# Patient Record
Sex: FEMALE | Race: WHITE
[De-identification: names, ages, dates, MRNs, and addresses within clinical notes are randomized per-mention and may not be internally consistent; named-entity substitution may affect disease eponyms.]

---

## 2017-07-20 ENCOUNTER — HOSPITAL ENCOUNTER (EMERGENCY)
Dept: HOSPITAL 62 - ER | Age: 22
LOS: 1 days | Discharge: LEFT BEFORE DISCHARGE | End: 2017-07-21
Payer: SELF-PAY

## 2017-07-20 VITALS — SYSTOLIC BLOOD PRESSURE: 128 MMHG | DIASTOLIC BLOOD PRESSURE: 79 MMHG

## 2017-07-20 DIAGNOSIS — Z53.21: Primary | ICD-10-CM

## 2018-04-24 ENCOUNTER — HOSPITAL ENCOUNTER (EMERGENCY)
Dept: HOSPITAL 62 - ER | Age: 23
LOS: 1 days | Discharge: HOME | End: 2018-04-25
Payer: MEDICAID

## 2018-04-24 DIAGNOSIS — G47.00: Primary | ICD-10-CM

## 2018-04-24 DIAGNOSIS — F17.200: ICD-10-CM

## 2018-04-24 DIAGNOSIS — Z91.14: ICD-10-CM

## 2018-04-24 DIAGNOSIS — Z88.6: ICD-10-CM

## 2018-04-24 LAB
ADD MANUAL DIFF: NO
ALBUMIN SERPL-MCNC: 4.4 G/DL (ref 3.5–5)
ALP SERPL-CCNC: 56 U/L (ref 38–126)
ALT SERPL-CCNC: 22 U/L (ref 9–52)
ANION GAP SERPL CALC-SCNC: 12 MMOL/L (ref 5–19)
APAP SERPL-MCNC: < 10 UG/ML (ref 10–30)
APPEARANCE UR: (no result)
APTT PPP: YELLOW S
AST SERPL-CCNC: 20 U/L (ref 14–36)
BARBITURATES UR QL SCN: NEGATIVE
BASOPHILS # BLD AUTO: 0.1 10^3/UL (ref 0–0.2)
BASOPHILS NFR BLD AUTO: 0.5 % (ref 0–2)
BILIRUB DIRECT SERPL-MCNC: 0.3 MG/DL (ref 0–0.4)
BILIRUB SERPL-MCNC: 0.3 MG/DL (ref 0.2–1.3)
BILIRUB UR QL STRIP: NEGATIVE
BUN SERPL-MCNC: 12 MG/DL (ref 7–20)
CALCIUM: 10 MG/DL (ref 8.4–10.2)
CHLORIDE SERPL-SCNC: 103 MMOL/L (ref 98–107)
CO2 SERPL-SCNC: 30 MMOL/L (ref 22–30)
EOSINOPHIL # BLD AUTO: 0.2 10^3/UL (ref 0–0.6)
EOSINOPHIL NFR BLD AUTO: 1.7 % (ref 0–6)
ERYTHROCYTE [DISTWIDTH] IN BLOOD BY AUTOMATED COUNT: 15.2 % (ref 11.5–14)
ETHANOL SERPL-MCNC: < 10 MG/DL
GLUCOSE SERPL-MCNC: 77 MG/DL (ref 75–110)
GLUCOSE UR STRIP-MCNC: NEGATIVE MG/DL
HCT VFR BLD CALC: 35.5 % (ref 36–47)
HGB BLD-MCNC: 11.8 G/DL (ref 12–15.5)
KETONES UR STRIP-MCNC: NEGATIVE MG/DL
LITHIUM SERPL-SCNC: 0.5 MEQ/L (ref 0.6–1.2)
LYMPHOCYTES # BLD AUTO: 2.4 10^3/UL (ref 0.5–4.7)
LYMPHOCYTES NFR BLD AUTO: 21.7 % (ref 13–45)
MCH RBC QN AUTO: 28.5 PG (ref 27–33.4)
MCHC RBC AUTO-ENTMCNC: 33.2 G/DL (ref 32–36)
MCV RBC AUTO: 86 FL (ref 80–97)
METHADONE UR QL SCN: NEGATIVE
MONOCYTES # BLD AUTO: 0.7 10^3/UL (ref 0.1–1.4)
MONOCYTES NFR BLD AUTO: 6.1 % (ref 3–13)
NEUTROPHILS # BLD AUTO: 7.7 10^3/UL (ref 1.7–8.2)
NEUTS SEG NFR BLD AUTO: 70 % (ref 42–78)
NITRITE UR QL STRIP: NEGATIVE
PCP UR QL SCN: NEGATIVE
PH UR STRIP: 7 [PH] (ref 5–9)
PLATELET # BLD: 249 10^3/UL (ref 150–450)
POTASSIUM SERPL-SCNC: 4.5 MMOL/L (ref 3.6–5)
PROT SERPL-MCNC: 7.1 G/DL (ref 6.3–8.2)
PROT UR STRIP-MCNC: NEGATIVE MG/DL
RBC # BLD AUTO: 4.13 10^6/UL (ref 3.72–5.28)
SALICYLATES SERPL-MCNC: < 1 MG/DL (ref 2–20)
SODIUM SERPL-SCNC: 144.7 MMOL/L (ref 137–145)
SP GR UR STRIP: 1.01
TOTAL CELLS COUNTED % (AUTO): 100 %
URINE AMPHETAMINES SCREEN: NEGATIVE
URINE BENZODIAZEPINES SCREEN: NEGATIVE
URINE COCAINE SCREEN: NEGATIVE
URINE MARIJUANA (THC) SCREEN: NEGATIVE
UROBILINOGEN UR-MCNC: NEGATIVE MG/DL (ref ?–2)
WBC # BLD AUTO: 11 10^3/UL (ref 4–10.5)

## 2018-04-24 PROCEDURE — 85025 COMPLETE CBC W/AUTO DIFF WBC: CPT

## 2018-04-24 PROCEDURE — 80178 ASSAY OF LITHIUM: CPT

## 2018-04-24 PROCEDURE — 93010 ELECTROCARDIOGRAM REPORT: CPT

## 2018-04-24 PROCEDURE — 80053 COMPREHEN METABOLIC PANEL: CPT

## 2018-04-24 PROCEDURE — 93005 ELECTROCARDIOGRAM TRACING: CPT

## 2018-04-24 PROCEDURE — 99285 EMERGENCY DEPT VISIT HI MDM: CPT

## 2018-04-24 PROCEDURE — 81001 URINALYSIS AUTO W/SCOPE: CPT

## 2018-04-24 PROCEDURE — 80307 DRUG TEST PRSMV CHEM ANLYZR: CPT

## 2018-04-24 PROCEDURE — 36415 COLL VENOUS BLD VENIPUNCTURE: CPT

## 2018-04-24 NOTE — ER DOCUMENT REPORT
ED Psych Disorder / Suicide





- General


Chief Complaint: Psych Problem


Stated Complaint: PSYCH EVAL


Time Seen by Provider: 04/24/18 16:44


Notes: 


The patient is a 22-year-old female, past medical history prior suicide attempts

, psychosis, presents requesting help with her medications.  She has not slept 

for the past week.  EMS brought in her medications and she has not taken her 

nightly Risperdal or Lithium for the past 3 days.  She said Lamictal has helped 

her in the past.  She denies any SI, HI or substance abuse for the past week.  

Her last drug use was 1 week ago and included cocaine, heroin and 

methamphetamines.  Patient denies chest pain, shortness of breath, headache, 

fevers, neck stiffness or attempt to harm herself.


TRAVEL OUTSIDE OF THE [place] IN LAST 30 DAYS: No





- Related Data


Allergies/Adverse Reactions: 


 





quetiapine fumarate [From Seroquel] Allergy (Severe, Verified 05/20/16 14:08)


 


acetaminophen [From Tylenol] Allergy (Verified 05/20/16 14:08)


 











Past Medical History





- General


Information source: Patient





- Social History


Smoking Status: Current Every Day Smoker


Frequency of alcohol use: Social


Drug Abuse: Cocaine, Heroin, Methamphetamine


Family History: Reviewed & Not Pertinent


Patient has suicidal ideation: No


Patient has homicidal ideation: No


Pulmonary Medical History: 


   Denies: Hx Tuberculosis


Neurological Medical History: Reports: Hx Migraine.  Denies: Hx Cerebrovascular 

Accident, Hx Seizures


Endocrine Medical History: Denies: Hx Graves' Disease, Hx Hyperthyroidism, Hx 

Hypothyroidism


Renal/ Medical History: Denies: Hx End Stage Renal Disease, Hx Kidney Stones, 

Hx Ovarian Cysts, Hx Peritoneal Dialysis, Hx Pelvic Inflammatory Disease


Malignancy Medical History: Denies: Hx Breast Cancer, Hx Cervical Cancer, Hx 

Leukemia, Hx Ovarian Cancer


Musculoskeltal Medical History: Denies Hx Multiple Sclerosis, Reports Hx 

Musculoskeletal Deformity, Reports Hx Musculoskeletal Trauma


Psychiatric Medical History: Reports: Hx Attention Deficit Hyperactivity 

Disorder, Hx Bipolar Disorder, Hx Depression, Hx Schizophrenia


   Denies: Hx Dementia


Traumatic Medical History: Reports: Hx Fractures


Infectious Medical History: Denies: Hx HIV


Past Surgical History: Reports: Hx Orthopedic Surgery - Left tib/fib repair, 

left shouldar ac repair..  Denies: Hx Pacemaker





- Immunizations


Immunizations up to date: Yes


Hx Diphtheria, Pertussis, Tetanus Vaccination: Yes





Review of Systems





- Review of Systems


Notes: 


REVIEW OF SYSTEMS:


CONSTITUTIONAL: -fevers, -chills


EENT: -eye pain, -difficulty swallowing, -nasal congestion


CARDIOVASCULAR: -chest pain, -syncope.


RESPIRATORY: -cough, -SOB


GASTROINTESTINAL: -abdominal pain, -nausea, -vomiting, -diarrhea


GENITOURINARY: -dysuria, -hematuria


MUSCULOSKELETAL: -back pain, -neck pain


SKIN: -rash or skin lesions.


HEMATOLOGIC: -easy bruising or bleeding.


LYMPHATIC: -swollen, enlarged glands.


NEUROLOGICAL: -altered mental status or loss of consciousness, -headache, -

neurologic symptoms


PSYCHIATRIC: -anxiety, -depression, +insomnia


ALL OTHER SYSTEMS REVIEWED AND NEGATIVE.





Physical Exam





- Vital signs


Vitals: 


 











Temp Pulse Resp BP Pulse Ox


 


 98.0 F   112 H  16   126/74 H  98 


 


 04/24/18 16:47  04/24/18 16:47  04/24/18 16:47  04/24/18 16:47  04/24/18 16:47














- Notes


Notes: 


PHYSICAL EXAMINATION:


GENERAL: Well-appearing, well-nourished and in no acute distress.


HEAD: Atraumatic, normocephalic.


EYES: Pupils equal round and reactive to light, extraocular movements intact, 

sclera anicteric, conjunctiva are normal.


ENT: nares patent, oropharynx clear without exudates.  Moist mucous membranes.


NECK: Normal range of motion, supple without lymphadenopathy


LUNGS: Breath sounds clear to auscultation bilaterally and equal.  No wheezes 

rales or rhonchi.


HEART: Mild tachycardia.


ABDOMEN: Soft, nontender, normoactive bowel sounds.  No guarding, no rebound.  

No masses appreciated.


EXTREMITIES: Normal range of motion, no pitting or edema.  No cyanosis.


NEUROLOGICAL: Cranial nerves grossly intact.  Normal speech, normal gait.  

Normal sensory and motor exams.


PSYCH: Appropriate mood, cooperative, denies SI or HI.  Does not appear acutely 

psychotic.


SKIN: Warm, Dry, normal turgor, no rashes or lesions noted.





Course





- Re-evaluation


Re-evalutation: 


Patient appears very well and is appropriate.  She does not appear acutely 

psychotic and has no SI or HI at this time.  No indication for IVC at this 

time.  Patient is requesting medication changes, as she says that Lamictal has 

helped her better than lithium or Risperdal.  She has her packet of medications 

with her and she has not taken her lithium or Risperdal for the past 3 days.  

She took her dose this morning.  Instructed her that her cocaine and 

methamphetamine use, along with not taking her Risperdal, is most likely 

leading to her insomnia.  She is counseled about always taking her medications 

and speaking to her primary mental health team about medication changes. Pt 

would like to stay overnight and speak to mental health about any medication 

changes.  She has no criteria for IVC.





- Vital Signs


Vital signs: 


 











Temp Pulse Resp BP Pulse Ox


 


 98.0 F   112 H  16   126/74 H  98 


 


 04/24/18 16:47  04/24/18 17:25  04/24/18 16:47  04/24/18 16:47  04/24/18 16:47














- Laboratory


Result Diagrams: 


 04/24/18 17:43





 04/24/18 17:43


Laboratory results interpreted by me: 


 











  04/24/18 04/24/18





  17:43 17:43


 


WBC  11.0 H 


 


Hgb  11.8 L 


 


Hct  35.5 L 


 


RDW  15.2 H 


 


Salicylates   < 1.0 L


 


Acetaminophen   < 10 L


 


Lithium   0.5 L














Discharge





- Discharge


Clinical Impression: 


 Nonadherence to medication





Insomnia


Qualifiers:


 Insomnia type: unspecified Qualified Code(s): G47.00 - Insomnia, unspecified





Condition: Stable

## 2018-04-24 NOTE — EKG REPORT
SEVERITY:- ABNORMAL ECG -

SINUS RHYTHM

ST ELEVATION SUGGESTS NORMAL VARIANT.

:

Confirmed by: Dilip Block MD 24-Apr-2018 18:35:39

## 2018-04-25 VITALS — DIASTOLIC BLOOD PRESSURE: 68 MMHG | SYSTOLIC BLOOD PRESSURE: 119 MMHG

## 2018-04-25 NOTE — PSYCHOLOGICAL NOTE
Psych Note





- Psych Note


Psych Note: 


Reason for consult:request for medication changes





The patient is a 22-year-old female, past medical history prior suicide attempts

, psychosis, presents requesting help with her medications.  She has not slept 

for the past week.  EMS brought in her medications and she has not taken her 

nightly Risperdal or Lithium for the past 3 days.  She said Lamictal has helped 

her in the past. 





Patient disclosed she needs her "medication regulated."  She is seen by 

[facility] and just had a home visit yesterday and 

disclosed her outpatient provider told her they would be holding off any 

medication changes currently. She reports that she came to [facility] ED for 

medication changes as a "jump decision that I now regret."  She discloses that 

he understands her outpatient provider is most appropriate resource for any 

medication changes since they follow her progress.  Patient agrees to contact 

her outpatient provider with further questions in regards to her medications.  

Patient denies missing any medications stating that she did receive a new pack 

of medications so her blister packs are overlapping and it might look like she 

has not taken any but she has. 





Patient is alert and orientated to person, place, time and circumstance.  Mood 

is euthymic with congruent affect as evidenced by patient smiling and openly 

engaging with clinician.  Patient denies suicidal and homicidal ideation.  

Delusions are absent and behaviors congruent with intact reality based 

presentation i.e. organized and linear thought processes.  Conversational 

speech was within normal rate, tone and prosody.  Eye contact was well-

maintained.  Intellectual abilities appear to be within the average range.  

Attention and concentration are good.  Insight, judgment, impulse control are 

currently good.





Behavioral Health Team contacted [facility] to confirm 

patient receives services through them. Patient is assigned to an ACT Team.  

She is currently in [facility] for housing and is being followed until the patient 

decides if she will return to [facility] or if she needs to transfer to 

a new provider if she stays in [facility]. Patient was just in [facility] for psychiatric treatment  for 03/14- 03/28.  Patient was in [facility] from 04/05-04/11.  Patient was voluntary while in [facility]. Patient was seen on Monday and received her monthly medication shot. 

Patient will be seen Monday at her home, but if she needs to be seen before she 

can walk in to the office. 





No medication changes at this time





Diagnosis


1. 296.44 (F31.2) Bipolar I with psychotic features





Impression/plan: Patient is cleared from acute psychiatric services. Patient 

has a higher level of care with [facility].  Patient was seen by her 

provider yesterday and disclosed they wanted to hold off on any medication 

changes.  Patient disclosed she had been taking her medications; however, her 

blister packs show she has missed a few days.  It is recommended the patient 

take her medication as prescribed and to follow up with her outpatient provider 

for any continued concerns for nonacute medication changes. Patient will be 

seen Monday at her home, but if she needs to be seen before she can walk in to 

the office. Dr. Ingram was consulted on the care and management of this patient

, attending physician is in agreement with recommendations and disposition.

## 2018-04-25 NOTE — ER DOCUMENT REPORT
Doctor's Note


Notes: 





04/25/18 10:22


As the rounding physician for our psychiatric patients, I have reviewed the 

chart, vitals, lab work. Patient has been examined and noted to be stable, she 

states that she is here to tweak her medications, wishes to be discharged home. 

I am awaiting mental health in put.

## 2018-11-15 ENCOUNTER — HOSPITAL ENCOUNTER (EMERGENCY)
Dept: HOSPITAL 62 - ER | Age: 23
LOS: 1 days | Discharge: HOME | End: 2018-11-16
Payer: SELF-PAY

## 2018-11-15 DIAGNOSIS — F31.9: Primary | ICD-10-CM

## 2018-11-15 DIAGNOSIS — Z88.6: ICD-10-CM

## 2018-11-15 DIAGNOSIS — F30.9: ICD-10-CM

## 2018-11-15 DIAGNOSIS — Z86.19: ICD-10-CM

## 2018-11-15 LAB
ADD MANUAL DIFF: NO
ALBUMIN SERPL-MCNC: 4.4 G/DL (ref 3.5–5)
ALP SERPL-CCNC: 58 U/L (ref 38–126)
ALT SERPL-CCNC: 59 U/L (ref 9–52)
ANION GAP SERPL CALC-SCNC: 10 MMOL/L (ref 5–19)
APPEARANCE UR: (no result)
APTT PPP: YELLOW S
AST SERPL-CCNC: 34 U/L (ref 14–36)
BARBITURATES UR QL SCN: NEGATIVE
BASOPHILS # BLD AUTO: 0 10^3/UL (ref 0–0.2)
BASOPHILS NFR BLD AUTO: 0.8 % (ref 0–2)
BILIRUB DIRECT SERPL-MCNC: 0.1 MG/DL (ref 0–0.4)
BILIRUB SERPL-MCNC: 0.7 MG/DL (ref 0.2–1.3)
BILIRUB UR QL STRIP: NEGATIVE
BUN SERPL-MCNC: 12 MG/DL (ref 7–20)
CALCIUM: 9.6 MG/DL (ref 8.4–10.2)
CHLORIDE SERPL-SCNC: 106 MMOL/L (ref 98–107)
CO2 SERPL-SCNC: 27 MMOL/L (ref 22–30)
EOSINOPHIL # BLD AUTO: 0.1 10^3/UL (ref 0–0.6)
EOSINOPHIL NFR BLD AUTO: 1.8 % (ref 0–6)
ERYTHROCYTE [DISTWIDTH] IN BLOOD BY AUTOMATED COUNT: 14.5 % (ref 11.5–14)
ETHANOL SERPL-MCNC: < 10 MG/DL
GLUCOSE SERPL-MCNC: 91 MG/DL (ref 75–110)
GLUCOSE UR STRIP-MCNC: NEGATIVE MG/DL
HCT VFR BLD CALC: 36.8 % (ref 36–47)
HGB BLD-MCNC: 12.6 G/DL (ref 12–15.5)
KETONES UR STRIP-MCNC: NEGATIVE MG/DL
LYMPHOCYTES # BLD AUTO: 1.7 10^3/UL (ref 0.5–4.7)
LYMPHOCYTES NFR BLD AUTO: 31.9 % (ref 13–45)
MCH RBC QN AUTO: 28.3 PG (ref 27–33.4)
MCHC RBC AUTO-ENTMCNC: 34.3 G/DL (ref 32–36)
MCV RBC AUTO: 83 FL (ref 80–97)
METHADONE UR QL SCN: NEGATIVE
MONOCYTES # BLD AUTO: 0.4 10^3/UL (ref 0.1–1.4)
MONOCYTES NFR BLD AUTO: 7.4 % (ref 3–13)
NEUTROPHILS # BLD AUTO: 3.2 10^3/UL (ref 1.7–8.2)
NEUTS SEG NFR BLD AUTO: 58.1 % (ref 42–78)
NITRITE UR QL STRIP: NEGATIVE
PCP UR QL SCN: NEGATIVE
PH UR STRIP: 7 [PH] (ref 5–9)
PLATELET # BLD: 188 10^3/UL (ref 150–450)
POTASSIUM SERPL-SCNC: 5.1 MMOL/L (ref 3.6–5)
PROT SERPL-MCNC: 7.3 G/DL (ref 6.3–8.2)
PROT UR STRIP-MCNC: NEGATIVE MG/DL
RBC # BLD AUTO: 4.45 10^6/UL (ref 3.72–5.28)
SODIUM SERPL-SCNC: 142.7 MMOL/L (ref 137–145)
SP GR UR STRIP: 1.02
TOTAL CELLS COUNTED % (AUTO): 100 %
URINE AMPHETAMINES SCREEN: NEGATIVE
URINE BENZODIAZEPINES SCREEN: NEGATIVE
URINE COCAINE SCREEN: NEGATIVE
URINE MARIJUANA (THC) SCREEN: NEGATIVE
UROBILINOGEN UR-MCNC: 2 MG/DL (ref ?–2)
WBC # BLD AUTO: 5.4 10^3/UL (ref 4–10.5)

## 2018-11-15 PROCEDURE — 80307 DRUG TEST PRSMV CHEM ANLYZR: CPT

## 2018-11-15 PROCEDURE — 85025 COMPLETE CBC W/AUTO DIFF WBC: CPT

## 2018-11-15 PROCEDURE — 93010 ELECTROCARDIOGRAM REPORT: CPT

## 2018-11-15 PROCEDURE — 80053 COMPREHEN METABOLIC PANEL: CPT

## 2018-11-15 PROCEDURE — 99285 EMERGENCY DEPT VISIT HI MDM: CPT

## 2018-11-15 PROCEDURE — 81001 URINALYSIS AUTO W/SCOPE: CPT

## 2018-11-15 PROCEDURE — 93005 ELECTROCARDIOGRAM TRACING: CPT

## 2018-11-15 PROCEDURE — 36415 COLL VENOUS BLD VENIPUNCTURE: CPT

## 2018-11-15 RX ADMIN — BENZTROPINE MESYLATE SCH MG: 1 TABLET ORAL at 16:44

## 2018-11-15 RX ADMIN — OLANZAPINE SCH MG: 5 TABLET, FILM COATED ORAL at 17:17

## 2018-11-15 NOTE — ER DOCUMENT REPORT
ED Psych Disorder / Suicide





- General


TRAVEL OUTSIDE OF THE [place] IN LAST 30 DAYS: No





<ARCHIE GODFREY - Last Filed: 11/15/18 17:55>





<ABELINO MASTERS - Last Filed: 18 08:49>





<HANNAHBENIGNO - Last Filed: 18 10:11>





- General


Chief Complaint: Psych Problem


Stated Complaint: PSYCH EVAL


Time Seen by Provider: 11/15/18 16:35


Notes: 





Patient was brought in by EMS and says that she needs "a break from grieving".  

She says that her best friend just  suddenly and unexpectedly.  During the 

conversation, patient exhibits flight of ideas, confused somewhat, and 

unwillingness to talk at times.  Says she does not want to mention the  

name but that it is a relative who has .  Patient says she feels very 

tired.  When asked about mental illness history, she mentions bipolar and 

schizophrenia.  Says she has been on medicines for these conditions but no 

longer taking anything at this time.  Sometimes, when the patient is answering 

my questions, I am not sure if she is doing so in an uncontrolled manner or if 

she is thinking and planning her responses to sound unusual or weird.





Says she has hepatitis C, diagnosed about a year ago.  Denies using any illicit 

drugs. (ARCHIE GODFREY)





- Related Data


Allergies/Adverse Reactions: 


 





quetiapine fumarate [From Seroquel] Allergy (Severe, Verified 16 14:08)


 


acetaminophen [From Tylenol] Allergy (Verified 16 14:08)


 











Past Medical History





- Social History


Smoking Status: Never Smoker


Chew tobacco use (# tins/day): No


Frequency of alcohol use: None


Drug Abuse: None


Family History: Reviewed & Not Pertinent


Patient has suicidal ideation: No


Patient has homicidal ideation: No


Pulmonary Medical History: 


   Denies: Hx Tuberculosis


Neurological Medical History: Reports: Hx Migraine


Musculoskeletal Medical History: Reports Hx Musculoskeletal Deformity, Reports 

Hx Musculoskeletal Trauma


Psychiatric Medical History: Reports: Hx Attention Deficit Hyperactivity 

Disorder, Hx Bipolar Disorder, Hx Depression, Hx Schizophrenia


   Denies: Hx Dementia


Traumatic Medical History: Reports: Hx Fractures


Infectious Medical History: Reports: Hx Hepatitis - Hepatitis C.  Denies: Hx HIV


Past Surgical History: Reports: Hx Orthopedic Surgery - Left tib/fib repair, 

left shouldar ac repair.





- Immunizations


Immunizations up to date: Yes


Hx Diphtheria, Pertussis, Tetanus Vaccination: Yes





<EPIFANIOARCHIE - Last Filed: 11/15/18 17:55>





Review of Systems





<ARCHIE GODFREY - Last Filed: 11/15/18 17:55>





<ABEILNO MASTERS - Last Filed: 18 08:49>





<BENIGNO YOUNG - Last Filed: 18 10:11>





- Review of Systems


Notes: 





REVIEW OF SYSTEMS: Patient is not very helpful.  Does not answer with much 

information.  





CONSTITUTIONAL :  Denies fever.


  


EENT:   Denies eye, ear, nose or mouth or throat pain or other symptoms.





CARDIOVASCULAR:  Denies chest pain.





RESPIRATORY:  Denies cough, chest congestion, or shortness of breath.





GASTROINTESTINAL:  Denies abdominal pain or nausea, vomiting, or diarrhea.





GENITOURINARY:  Denies difficulty or painful urinating, urinary frequency, 

blood in urine.





MUSCULOSKELETAL:  Denies back or neck pain.  Denies joint pain or swelling.





SKIN:   Denies rash or skin lesions.





NEUROLOGICAL:  Denies LOC or altered mental status.  Sometimes headache.  

Denies sensory loss or motor deficits.





ALL OTHER SYSTEMS REVIEWED AND NEGATIVE. (ARCHIE GODFREY)





Physical Exam





- Vital signs


Interpretation: Normal





<ARCHIE GODFREY - Last Filed: 11/15/18 17:55>





<ABELINO MASTERS - Last Filed: 18 08:49>





<BENIGNO YOUNG - Last Filed: 18 10:11>





- Vital signs


Vitals: 


 











Temp Pulse Resp BP Pulse Ox


 


 98.0 F   85   16   135/80 H  100 


 


 11/15/18 15:55  11/15/18 15:55  11/15/18 15:55  11/15/18 15:55  11/15/18 15:55











Notes: 





PHYSICAL EXAMINATION:





GENERAL: Well-appearing, in no acute distress.  Awake and alert, but seems 

confused and answers some questions appropriately while being invasive with 

other questions.  Sometimes I get the impression that the patient is thinking 

about her conversation so that she can sound strange or weird.  She does seem 

to be somewhat hyper.





HEAD: Atraumatic, normocephalic.





EYES: Pupils equal round and reactive to light, extraocular movements intact.





ENT: oropharynx clear without exudates.  Moist mucous membranes.





NECK: Normal range of motion, supple.





LUNGS: Breath sounds clear and equal bilaterally.





HEART: Regular rate and rhythm without murmurs.





ABDOMEN: Soft, nontender.  No guarding or rebound.  No masses.





BACK:  No tenderness throughout entire back.





EXTREMITIES: Normal range of motion without pain.





NEUROLOGICAL:  Normal speech, normal gait.  Normal sensory, motor, and reflex 

exams.  Awake, alert, and oriented x3.  Cranial nerves normal.





PSYCH: Calm.  Flights of ideas.  Hyper conversation.





SKIN: Warm, dry, no rashes. (ARCHIE GODFREY)





Course





- Laboratory


Result Diagrams: 


 11/15/18 15:30





 11/15/18 15:30





- EKG Interpretation by Me


EKG shows normal: Sinus rhythm


Rate: Normal


Rhythm: NSR





<ARCHIE GODFREY - Last Filed: 11/15/18 17:55>





- Laboratory


Result Diagrams: 


 11/15/18 15:30





 11/15/18 15:30





<ABELINO MASTERS - Last Filed: 18 08:49>





- Laboratory


Result Diagrams: 


 11/15/18 15:30





 11/15/18 15:30





<BENIGNO YOUNG - Last Filed: 18 10:11>





- Re-evaluation


Re-evalutation: 





18 10:11


Patient was evaluated at bedside by myself earlier this morning.  Patient is 

resting comfortably with no obvious distress.  Patient states feeling much 

better at this time denies any homicidal suicidal ideation patient also has no 

signs of acute psychosis or pollo.  Patient agrees with discharge home follow-

up as directed by our psychiatric providers.  Patient will be discharged home. (

BENIGNO YOUNG)





- Vital Signs


Vital signs: 


 











Temp Pulse Resp BP Pulse Ox


 


 98.2 F   84   16   105/63   100 


 


 18 09:26  18 09:26  18 09:26  18 09:26  18 09:26














- Laboratory


Laboratory results interpreted by me: 


 











  11/15/18 11/15/18 11/15/18





  15:30 15:30 15:30


 


RDW  14.5 H  


 


Potassium   5.1 H 


 


ALT   59 H 


 


Urine Urobilinogen    2.0 H














Discharge





<ARCHIE GODFREY - Last Filed: 11/15/18 17:55>





<ABELINO MASTERS - Last Filed: 18 08:49>





<BENIGNO YOUNG - Last Filed: 18 10:11>





- Discharge


Clinical Impression: 


 Manic behavior





Bipolar disorder


Qualifiers:


 Active/Remission status: currently active Current bipolar episode type: manic 

Current episode severity: unspecified Qualified Code(s): F31.9 - Bipolar 

disorder, unspecified





Condition: Stable


Disposition: HOME, SELF-CARE


Additional Instructions: 


You have been evaluated by both medical and behavioral health teams and been 

deemed appropriate for discharge.  You have been started on new medications of 

Zyprexa at 5 mg twice daily, Cogentin 1 mg daily and clonidine 0.1mg nightly; 

these take as directed.  You are recommended follow-up with your outpatient 

mental health provider, dinh, in 3-5 days for continued outpatient mental 

services.





Bipolar Disorder





     Bipolar disorder is also called manic-depressive disorder. Depression 

alternates with brain hyperactivity called pollo.  Each phase lasts from 

several days to a few weeks.  We don't know exactly what causes bipolar disorder

, but it's treatable.


     During the "manic phase," you may feel elated and energetic.  You may have 

racing thoughts, rapid speech, increased activity, and grandiose ideas.  During 

this time, you may not realize how poor your judgement is.  Inappropriate 

spending, drug abuse, excessive alcohol use, marriage problems, and 

irresponsible sexual behavior are common during the manic phase.


     During the "depressive phase," you might feel depressed, guilty, worthless

, fatigued, and unable to concentrate.  You might have thoughts of suicide.


     Good treatments are available for bipolar disorder. Lithium is a classic 

drug for bipolar disorder, and is still often useful. If the manic phase is 

very mild, an antidepressant alone can be prescribed.  If the manic phase is 

very severe, an antipsychotic medicine (such as Haldol) may be needed. The 

treatment must be matched to your symptoms, so it's important to work closely 

with your psychiatric care provider.


     Contact your physician, the hospital emergency center, crisis line, or 

your counsellor if you are losing control or having self-destructive thoughts.











AT ANY TIME, IF YOUR SYMPTOMS CHANGE SIGNIFICANTLY OR WORSEN OR YOU DEVELOP NEW 

SYMPTOMS, RETURN TO THE EMERGENCY DEPARTMENT IMMEDIATELY FOR RE-EVALUATION.














Prescriptions: 


Benztropine Mesylate [Cogentin 1 mg Tablet] 1 mg PO QHS #7 tablet


Clonidine HCl [Catapres] 0.1 mg PO DAILY #7 tablet


Olanzapine [Zyprexa 5 mg Tablet] 5 mg PO Q12 #14 tablet


Referrals: 


[facility] [Outside] - Follow up in 3-5 days

## 2018-11-15 NOTE — PSYCHOLOGICAL NOTE
Psych Note





- Psych Note


Date seen by psych provider: 11/15/18


Time seen by psych provider: 15:40


Psych Note: 


Reason for Consult: manic





Patient presented to [facility] ED via EMS.  Patient reports that she had bad news of 

finding out her best friend  today; patient became tearful and stood flat 

against the wall.  She continued reports that her best friend is her cousin and 

her "grandmother's sister's aunt."  Patient then started to laugh and reported 

that she did not even understand what she just said.  Patient states "yes I am 

in a manic episode... Yes I screamed in the law heard me... Yes I broke the 

fridge with BPA... Yes the water bottle from the car was BPA free and I really 

appreciated being able to drink out of that... Yes I saw the Grim Dali.... Yes 

I heard (unintelligible)..." Patient then stated that she would really wish she 

could sleep until 6 AM tomorrow.  At this point patient attempted to show the 

clinician that she scratched her stomach and lifted her shirt all the way up to 

her neck.  Clinician reported she would talk to the attending physician to see 

what can be done to assist with helping her sleep.  Patient stated thank you 

and started crying again.  As clinician open the door was noted the patient 

continued to speak and continued to make demonstrate facial affect indicating 

she was still talking; however, no sound was coming from her mouth.





Patient is alert and orientated to person, place, time and circumstance.  Mood 

is manic with labile affect.  Patient denies suicidal homicidal ideation.  

Patient reports seeing the Uriel reaper.  Patient is currently having 

difficulty with linear organized conversation demonstrating flight of thought.  

Attention and concentration is poor.  Insight, judgment, impulse control is 

poor.





Medication recommendations per [facility]'s contracted psychiatrist Dr. Anel MIKE 

are as follows 


Zyprexa MG twice daily 


clonidine 0.1 mg nightly 


Cogentin 1 mg daily





Diagnosis


1. 296.44 (F31.2) Bipolar I with psychotic features





Impression\plan: Patient is recommended for IVC.  Patient is currently manic, 

labile affect with flight of thought and possible hallucinations.  Patient's 

attention, concentration, insight, judgment, and impulse control is poor; she 

is a danger to herself.  Medication recommendations have been provided. patient 

will be reevaluated.  Dr. Ingram was consulted and the care management this 

patient; attending physician is agreement with recommendations and disposition.

## 2018-11-16 VITALS — DIASTOLIC BLOOD PRESSURE: 63 MMHG | SYSTOLIC BLOOD PRESSURE: 105 MMHG

## 2018-11-16 RX ADMIN — BENZTROPINE MESYLATE SCH MG: 1 TABLET ORAL at 09:19

## 2018-11-16 RX ADMIN — OLANZAPINE SCH MG: 5 TABLET, FILM COATED ORAL at 09:18

## 2018-11-16 NOTE — PSYCHOLOGICAL NOTE
Psych Note





- Psych Note


Date seen by psych provider: 11/16/18


Time seen by psych provider: 07:35


Psych Note: 


Reason for Consult: manic


Consent permissions: Patient's mother and father Vania [phone]





Check-in conducted with patient


Patient reports that she is feeling much better.  She confirms that she got 

good sleep last night.  When asked about seeing the Grim Reaper previously she 

states that it was the day before yesterday.  She disclosed that she went [place] 

[place] for medication evaluation however was unable to be seen.  She states she 

was trying to get in for medication management because she had been put on a 

new medication that she reports did not make her feel right.   She states that 

when she got home she remembers closing her eyes and seeing a figure that she 

felt was the Gram Reaper. She states that it was really small and looked like a 

cartoon.  She denies seeing it since then.  Patient provided consent to contact 

her parents Rosey and Jayesh.  She reports that her parents were really 

concerned because she had not been sleeping so is very glad that she it was 

able to get some sleep last night.





Behavior health team attempted contact with patient's parents; left message.





Medication recommendations per [facility]'s contracted psychiatrist Dr. Anel MIKE 

are as follows 


Zyprexa MG twice daily 


clonidine 0.1 mg nightly 


Cogentin 1 mg daily





Diagnosis


1. 296.44 (F31.2) Bipolar I with psychotic features





Impression\plan: Patient is recommended for rescind of IVC and cleared from 

acute psychiatric services.  Patient is no longer demonstrating manic behavior.

  She is sitting calm and able to have an organized linear conversation.  

Patient disclosed that she was having some difficulties with new medications 

she was put on.  Dr. Ingram was consulted and the care management this patient

; attending physician is agreement with recommendations and disposition.

## 2018-11-16 NOTE — ER DOCUMENT REPORT
Doctor's Note


Notes: 





11/16/18 09:18


Patient has been seen and evaluated resting comfortably no acute distress.  

Laboratory values previous provider note and vital signs have been evaluated.  

Patient otherwise looks to be stable for disposition/transfer.

## 2019-01-13 ENCOUNTER — HOSPITAL ENCOUNTER (EMERGENCY)
Dept: HOSPITAL 62 - ER | Age: 24
LOS: 1 days | Discharge: HOME | End: 2019-01-14
Payer: MEDICAID

## 2019-01-13 DIAGNOSIS — F31.2: Primary | ICD-10-CM

## 2019-01-13 DIAGNOSIS — R45.1: ICD-10-CM

## 2019-01-13 DIAGNOSIS — Z88.8: ICD-10-CM

## 2019-01-13 LAB
ADD MANUAL DIFF: NO
APPEARANCE UR: (no result)
APTT PPP: YELLOW S
BARBITURATES UR QL SCN: (no result)
BASOPHILS # BLD AUTO: 0 10^3/UL (ref 0–0.2)
BASOPHILS NFR BLD AUTO: 0.8 % (ref 0–2)
BILIRUB UR QL STRIP: NEGATIVE
EOSINOPHIL # BLD AUTO: 0.1 10^3/UL (ref 0–0.6)
EOSINOPHIL NFR BLD AUTO: 3.1 % (ref 0–6)
ERYTHROCYTE [DISTWIDTH] IN BLOOD BY AUTOMATED COUNT: 14.2 % (ref 11.5–14)
GLUCOSE UR STRIP-MCNC: NEGATIVE MG/DL
HCT VFR BLD CALC: 33.8 % (ref 36–47)
HGB BLD-MCNC: 11.6 G/DL (ref 12–15.5)
KETONES UR STRIP-MCNC: (no result) MG/DL
LYMPHOCYTES # BLD AUTO: 1.8 10^3/UL (ref 0.5–4.7)
LYMPHOCYTES NFR BLD AUTO: 40.1 % (ref 13–45)
MCH RBC QN AUTO: 27.8 PG (ref 27–33.4)
MCHC RBC AUTO-ENTMCNC: 34.4 G/DL (ref 32–36)
MCV RBC AUTO: 81 FL (ref 80–97)
METHADONE UR QL SCN: NEGATIVE
MONOCYTES # BLD AUTO: 0.4 10^3/UL (ref 0.1–1.4)
MONOCYTES NFR BLD AUTO: 8.2 % (ref 3–13)
NEUTROPHILS # BLD AUTO: 2.2 10^3/UL (ref 1.7–8.2)
NEUTS SEG NFR BLD AUTO: 47.8 % (ref 42–78)
NITRITE UR QL STRIP: NEGATIVE
PCP UR QL SCN: NEGATIVE
PH UR STRIP: 5 [PH] (ref 5–9)
PLATELET # BLD: 149 10^3/UL (ref 150–450)
PROT UR STRIP-MCNC: NEGATIVE MG/DL
RBC # BLD AUTO: 4.18 10^6/UL (ref 3.72–5.28)
SP GR UR STRIP: 1.03
TOTAL CELLS COUNTED % (AUTO): 100 %
URINE AMPHETAMINES SCREEN: NEGATIVE
URINE BENZODIAZEPINES SCREEN: NEGATIVE
URINE COCAINE SCREEN: NEGATIVE
URINE MARIJUANA (THC) SCREEN: (no result)
UROBILINOGEN UR-MCNC: 2 MG/DL (ref ?–2)
WBC # BLD AUTO: 4.6 10^3/UL (ref 4–10.5)

## 2019-01-13 PROCEDURE — 80307 DRUG TEST PRSMV CHEM ANLYZR: CPT

## 2019-01-13 PROCEDURE — 85025 COMPLETE CBC W/AUTO DIFF WBC: CPT

## 2019-01-13 PROCEDURE — 84703 CHORIONIC GONADOTROPIN ASSAY: CPT

## 2019-01-13 PROCEDURE — 93010 ELECTROCARDIOGRAM REPORT: CPT

## 2019-01-13 PROCEDURE — 36415 COLL VENOUS BLD VENIPUNCTURE: CPT

## 2019-01-13 PROCEDURE — 81001 URINALYSIS AUTO W/SCOPE: CPT

## 2019-01-13 PROCEDURE — 80053 COMPREHEN METABOLIC PANEL: CPT

## 2019-01-13 PROCEDURE — 99285 EMERGENCY DEPT VISIT HI MDM: CPT

## 2019-01-13 PROCEDURE — 93005 ELECTROCARDIOGRAM TRACING: CPT

## 2019-01-13 NOTE — ER DOCUMENT REPORT
Addendum entered and electronically signed by ARCHIE GODFREY MD  01/14/19 15:40:










Discharge





- Discharge


Clinical Impression: 


 Agitation





Bipolar disorder


Qualifiers:


 Active/Remission status: remission status unspecified Qualified Code(s): F31.9 

- Bipolar disorder, unspecified





Condition: Stable


Disposition: HOME, SELF-CARE


Additional Instructions: 


You have been evaluated by both medical and behavioral health teams and have 

deemed appropriate for discharge. You have been provided prescriptions for 

Zyprexa 5mg twice daily, Cogentin 1mg daily and Clonidine 0.5mg every evening. 

You are encouraged to follow up with your outpatient mental health provider, [facility]

in [place], for continued services. 





Bipolar Disorder





     Bipolar disorder is also called manic-depressive disorder. Depression 

alternates with brain hyperactivity called pollo.  Each phase lasts from several

days to a few weeks.  We don't know exactly what causes bipolar disorder, but 

it's treatable.


     During the "manic phase," you may feel elated and energetic.  You may have 

racing thoughts, rapid speech, increased activity, and grandiose ideas.  During 

this time, you may not realize how poor your judgement is.  Inappropriate 

spending, drug abuse, excessive alcohol use, marriage problems, and 

irresponsible sexual behavior are common during the manic phase.


     During the "depressive phase," you might feel depressed, guilty, worthless,

fatigued, and unable to concentrate.  You might have thoughts of suicide.


     Good treatments are available for bipolar disorder. Lithium is a classic 

drug for bipolar disorder, and is still often useful. If the manic phase is very

mild, an antidepressant alone can be prescribed.  If the manic phase is very 

severe, an antipsychotic medicine (such as Haldol) may be needed. The treatment 

must be matched to your symptoms, so it's important to work closely with your 

psychiatric care provider.


     Contact your physician, the hospital emergency center, crisis line, or your

counsellor if you are losing control or having self-destructive thoughts.








AT ANY TIME, IF YOUR SYMPTOMS CHANGE SIGNIFICANTLY OR WORSEN OR YOU DEVELOP NEW 

SYMPTOMS, RETURN TO THE EMERGENCY DEPARTMENT IMMEDIATELY FOR RE-EVALUATION.











Prescriptions: 


Clonidine HCl [Catapres 0.1 mg Tablet] 0.1 mg PO QHS #7 tablet


Benztropine Mesylate [Cogentin 1 mg Tablet] 1 mg PO DAILY #7 tablet


Olanzapine [Zyprexa 5 mg Tablet] 5 mg PO BID #14 tablet


Referrals: 


[facility] [Outside] - Follow up as needed





Addendum entered and electronically signed by ABELINO MASTERS LCSWA  01/14/19 

10:16: 








Discharge





- Discharge


Clinical Impression: 


 Agitation





Bipolar disorder


Qualifiers:


 Active/Remission status: remission status unspecified Qualified Code(s): F31.9 

- Bipolar disorder, unspecified





Condition: Stable


Disposition: HOME, SELF-CARE


Additional Instructions: 


You have been evaluated by both medical and behavioral health teams and have 

deemed appropriate for discharge. You have been provided prescriptions for 

Zyprexa 5mg twice daily, Cogentin 1mg daily and Clonidine 0.5mg every evening. 

You are encouraged to follow up with your outpatient mental health provider, [facility]

 in [place], for continued services. 





Bipolar Disorder





     Bipolar disorder is also called manic-depressive disorder. Depression 

alternates with brain hyperactivity called pollo.  Each phase lasts from several

 days to a few weeks.  We don't know exactly what causes bipolar disorder, but 

it's treatable.


     During the "manic phase," you may feel elated and energetic.  You may have 

racing thoughts, rapid speech, increased activity, and grandiose ideas.  During 

this time, you may not realize how poor your judgement is.  Inappropriate 

spending, drug abuse, excessive alcohol use, marriage problems, and 

irresponsible sexual behavior are common during the manic phase.


     During the "depressive phase," you might feel depressed, guilty, worthless,

 fatigued, and unable to concentrate.  You might have thoughts of suicide.


     Good treatments are available for bipolar disorder. Lithium is a classic 

drug for bipolar disorder, and is still often useful. If the manic phase is very

 mild, an antidepressant alone can be prescribed.  If the manic phase is very 

severe, an antipsychotic medicine (such as Haldol) may be needed. The treatment 

must be matched to your symptoms, so it's important to work closely with your 

psychiatric care provider.


     Contact your physician, the hospital emergency center, crisis line, or your

 counsellor if you are losing control or having self-destructive thoughts.








AT ANY TIME, IF YOUR SYMPTOMS CHANGE SIGNIFICANTLY OR WORSEN OR YOU DEVELOP NEW 

SYMPTOMS, RETURN TO THE EMERGENCY DEPARTMENT IMMEDIATELY FOR RE-EVALUATION.











Referrals: 


[facility] [Outside] - Follow up as needed





Original Note:








ED General





- General


Chief Complaint: Altered Mental Status


Stated Complaint: PSYCH EVAL


Time Seen by Provider: 01/13/19 18:18


Cannot obtain history due to: Uncooperative, Altered mental status


Notes: 





Patient is a 23-year-old female with a past medical history of bipolar type I 

with psychotic features who presents clearly quite agitated, apparently off all 

medications.  I am unable to get any additional history from the patient as she 

is quite agitated when she really states to me is "I have been watching [employer], 

I am going to put a lawsuit on you".  She then states that she remembers me from

 apparently standing by a printer a year ago.  When I inform her that we have 

never met before she stopped speaking to me.


TRAVEL OUTSIDE OF THE [place] IN LAST 30 DAYS: No





- Related Data


Allergies/Adverse Reactions: 


                                        





quetiapine fumarate [From Seroquel] Allergy (Severe, Verified 05/20/16 14:08)


   


acetaminophen [From Tylenol] Allergy (Verified 05/20/16 14:08)


   











Past Medical History





- General


Information source: Patient


Cannot obtain history due to: Uncooperative, Altered mental status





- Social History


Smoking Status: Unknown if Ever Smoked


Lives with: Family


Family History: Reviewed & Not Pertinent


Patient has suicidal ideation: No


Patient has homicidal ideation: No


Pulmonary Medical History: 


   Denies: Hx Tuberculosis


Neurological Medical History: Reports: Hx Migraine.  Denies: Hx Seizures


Renal/ Medical History: Denies: Hx End Stage Renal Disease, Hx Kidney Stones, 

Hx Peritoneal Dialysis


GI Medical History: Reports: Hx Hepatitis - Hepatitis C


Musculoskeletal Medical History: Reports Hx Musculoskeletal Deformity, Reports 

Hx Musculoskeletal Trauma


Psychiatric Medical History: Reports: Hx Attention Deficit Hyperactivity 

Disorder, Hx Bipolar Disorder, Hx Depression, Hx Schizophrenia


   Denies: Hx Dementia


Traumatic Medical History: Reports: Hx Fractures


Infectious Medical History: Reports: Hx Hepatitis - Hepatitis C.  Denies: Hx HIV


Past Surgical History: Reports: Hx Orthopedic Surgery - Left tib/fib repair, 

left shouldar ac repair..  Denies: Hx Pacemaker





- Immunizations


Immunizations up to date: Yes


Hx Diphtheria, Pertussis, Tetanus Vaccination: Yes





Review of Systems





- Review of Systems


-: Yes ROS unobtainable due to patient's medical condition





Physical Exam





- Vital signs


Vitals: 


                                        











Temp Pulse Resp BP Pulse Ox


 


 98.7 F   86   16   119/80   99 


 


 01/13/19 17:58  01/13/19 17:58  01/13/19 17:58  01/13/19 17:58  01/13/19 17:58











Notes: 





PHYSICAL EXAMINATION:





GENERAL: Covered in a paint material on her face and chest.  Seems agitated and 

paranoid.





HEAD: Atraumatic, normocephalic.





EYES: Pupils equal round and reactive to light, extraocular movements intact, 

sclera anicteric, conjunctiva are normal.





ENT: nares patent, oropharynx clear without exudates.  Moist mucous membranes.





NECK: Normal range of motion, supple without lymphadenopathy





LUNGS: Breath sounds clear to auscultation bilaterally and equal.  No wheezes 

rales or rhonchi.





HEART: Regular rate and rhythm without murmurs





EXTREMITIES: Normal range of motion, no pitting or edema.  No cyanosis.





NEUROLOGICAL: No focal neurological deficits. Moves all extremities spontane

ously





PSYCH: Agitated, does not speak in any coherent manner.





SKIN: Warm, Dry, normal turgor, no rashes or lesions noted.





Course





- Re-evaluation


Re-evalutation: 





01/13/19 18:30


Presentation of a patient with known history of bipolar disorder with features 

that appears to be having an acute exacerbation of this underlying condition 

secondary to discontinuation of medications.  The patient is quite agitated, 

intermittently hostile towards staff although redirectable at this time.  She 

does not provide meaningful history.  Medical screening exam unremarkable.  

Medical screening labs are pending.  Patient is clearly a danger to herself and 

others at this time.  Involuntary hold has been placed.  Patient will be given 5

mg oral haloperidol to prevent escalation.  She is otherwise medically cleared 

for evaluation and disposition by behavioral health in the morning.





- Vital Signs


Vital signs: 


                                        











Temp Pulse Resp BP Pulse Ox


 


 98.7 F   86   16   119/80   99 


 


 01/13/19 17:58  01/13/19 17:58  01/13/19 17:58  01/13/19 17:58  01/13/19 17:58














- Laboratory


Result Diagrams: 


                                 01/13/19 23:30





                                 01/13/19 23:30


Laboratory results interpreted by me: 


                                        











  01/13/19 01/13/19 01/13/19





  18:30 23:30 23:30


 


Hgb   11.6 L 


 


Hct   33.8 L 


 


RDW   14.2 H 


 


Plt Count   149 L 


 


Glucose    119 H


 


Total Protein    6.1 L


 


Urine Ketones  TRACE H  


 


Urine Urobilinogen  2.0 H  


 


Urine Ascorbic Acid  20 H  


 


Salicylates    < 1.0 L


 


Acetaminophen    < 10 L














- EKG Interpretation by Me


Additional EKG results interpreted by me: 





01/14/19 02:19


Sinus rhythm, rate 74.  No ST elevations or depressions.  QTC is 422.





Discharge





- Discharge


Clinical Impression: 


 Agitation





Bipolar disorder


Qualifiers:


 Active/Remission status: remission status unspecified Qualified Code(s): F31.9 

- Bipolar disorder, unspecified





Condition: Stable

## 2019-01-14 VITALS — DIASTOLIC BLOOD PRESSURE: 62 MMHG | SYSTOLIC BLOOD PRESSURE: 112 MMHG

## 2019-01-14 LAB
ALBUMIN SERPL-MCNC: 4 G/DL (ref 3.5–5)
ALP SERPL-CCNC: 49 U/L (ref 38–126)
ALT SERPL-CCNC: 44 U/L (ref 9–52)
ANION GAP SERPL CALC-SCNC: 6 MMOL/L (ref 5–19)
APAP SERPL-MCNC: < 10 UG/ML (ref 10–30)
AST SERPL-CCNC: 30 U/L (ref 14–36)
BILIRUB DIRECT SERPL-MCNC: 0.1 MG/DL (ref 0–0.4)
BILIRUB SERPL-MCNC: 0.3 MG/DL (ref 0.2–1.3)
BUN SERPL-MCNC: 18 MG/DL (ref 7–20)
CALCIUM: 9.3 MG/DL (ref 8.4–10.2)
CHLORIDE SERPL-SCNC: 106 MMOL/L (ref 98–107)
CO2 SERPL-SCNC: 28 MMOL/L (ref 22–30)
ETHANOL SERPL-MCNC: < 10 MG/DL
GLUCOSE SERPL-MCNC: 119 MG/DL (ref 75–110)
POTASSIUM SERPL-SCNC: 4.2 MMOL/L (ref 3.6–5)
PROT SERPL-MCNC: 6.1 G/DL (ref 6.3–8.2)
SALICYLATES SERPL-MCNC: < 1 MG/DL (ref 2–20)
SODIUM SERPL-SCNC: 139.8 MMOL/L (ref 137–145)

## 2019-01-14 NOTE — PSYCHOLOGICAL NOTE
Psych Note





- Psych Note


Date seen by psych provider: 01/14/19


Time seen by psych provider: 07:40


Psych Note: 


Reason for Consult: manic





Patient is a 23-year-old female with a past medical history of bipolar type I 

with psychotic features who initially presented agitated, irritable and 

apparently off all medications.  Upon evaluation patient is calm and engages in 

organized and linear conversation.  She reports that she has been having 

difficulty getting into her outpatient mental health provider so recently 

switched to [facility].  She confirms that she will be following up with 

them.  Patient is observed with light purple paint on her face and when quest

ioned she reports that yesterday the family was playing football and dressed up 

for the occasion.  She reports those had been a long time since her mother and 

father and her could get together with the family so they went "all out."  She 

reports that she has not been on medications since having difficulties seeing 

her provider and would appreciate any assistance with obtaining her medication. 

She confirms that she can afford her medications and that she will take them as 

directed.





Patient is alert and orientated to person, place, time and circumstance.  Mood 

is euthymic with congruent affect.  Patient denies suicidal and homicidal 

ideation.  Delusions are absent behaviors congruent with an intact reality based

presentation i.e. organized and linear thought process.  Eye contact was well-

maintained.  Conversational speech is within normal rate, tone and prosody.  

Intellectual abilities appear to be within the average range.  Attention and 

concentration are currently good.  Insight, judgment, impulse control are 

historically fair.





Medication recommendations per [facility]'s contracted psychiatrist Dr. Anel MIKE 

are as follows 


Zyprexa 5mg twice daily 


clonidine 0.1 mg nightly 


Cogentin 1 mg daily





Diagnosis


1. 296.44 (F31.2) Bipolar I with psychotic features





Impression\plan: Patient is recommended for rescind of IVC and cleared from 

acute psychiatric services.  Patient is no longer demonstrating manic behavior. 

She is sitting calm and able to have an organized linear conversation.  Patient 

disclosed that she was having some difficulties getting in to see her provider 

but confirms she is now with [facility].  Patient historically has had a 

higher level of care with [facility] and quickly stabilizes (overnight) in ED when 

needed.   Dr. Ingram was consulted and the care management this patient; 

attending physician is agreement with recommendations and disposition.

## 2019-01-14 NOTE — ER DOCUMENT REPORT
Doctor's Note


Notes: 





01/14/19 09:48


Rounds: Chart reviewed and patient interviewed.  Patient with a history of 

bipolar disorder came in agitated and hyper.  Apparently is out of her 

medications.  Lab studies were all normal except she is positive for 

barbiturates and marijuana on his drug screens.  Vital signs are all essentially

normal.  Patient appears to be medically stable for transfer or discharge.


CIRA Villafana MD

## 2019-08-12 ENCOUNTER — HOSPITAL ENCOUNTER (EMERGENCY)
Dept: HOSPITAL 62 - ER | Age: 24
Discharge: LEFT BEFORE DISCHARGE | End: 2019-08-12
Payer: COMMERCIAL

## 2019-08-12 VITALS — SYSTOLIC BLOOD PRESSURE: 135 MMHG | DIASTOLIC BLOOD PRESSURE: 78 MMHG

## 2019-08-12 DIAGNOSIS — Z53.21: Primary | ICD-10-CM
